# Patient Record
Sex: MALE | Employment: OTHER | URBAN - METROPOLITAN AREA
[De-identification: names, ages, dates, MRNs, and addresses within clinical notes are randomized per-mention and may not be internally consistent; named-entity substitution may affect disease eponyms.]

---

## 2021-11-26 ENCOUNTER — THERAPY VISIT (OUTPATIENT)
Dept: PHYSICAL THERAPY | Facility: CLINIC | Age: 66
End: 2021-11-26
Payer: COMMERCIAL

## 2021-11-26 DIAGNOSIS — M79.671 PAIN IN BOTH FEET: ICD-10-CM

## 2021-11-26 DIAGNOSIS — M79.672 PAIN IN BOTH FEET: ICD-10-CM

## 2021-11-26 PROCEDURE — 97161 PT EVAL LOW COMPLEX 20 MIN: CPT | Mod: GP

## 2021-11-26 PROCEDURE — 97110 THERAPEUTIC EXERCISES: CPT | Mod: GP

## 2021-11-26 NOTE — PROGRESS NOTES
Physical Therapy Initial Evaluation  Subjective:  The history is provided by the patient. No  was used.   Therapist Generated HPI Evaluation  Problem details: The patient reports he has had some numbness in the bottom of his feet for many years.  He had quadriceps tendon surgery many years ago, but now the the numbness in the bottom of the feet has become painful.  Hard surface is more painful. Worse at the day goes on. He reports he has a history of a problem in the right foot.  He recently saw Dr. Mireles who diagnosed him with plantar fascitis bilaterally..         Type of problem:  Bilateral ankles.    This is a chronic condition.  Condition occurred with:  Repetition/overuse.  Where condition occurred: for unknown reasons.  Patient reports pain:  Longitudinal arch.  Pain is described as aching, burning and sharp   Pain radiates to:  No radiation. Pain is worse in the P.M..  Since onset symptoms are gradually worsening.  Associated symptoms:  Edema, loss of motion/stiffness and numbness. Symptoms are exacerbated by walking, weight bearing and activity  and relieved by rest.      Restrictions due to condition include:  Working in normal job without restrictions.  Barriers include:  None as reported by patient.    Patient Health History  Noble Bustillos being seen for Plantar Fasciitis.     Problem began: 5/26/2021.   Problem occurred: Gradual   Pain is reported as 6/10 on pain scale.  General health as reported by patient is good.  Pertinent medical history includes: high blood pressure, overweight and osteoarthritis.   Red flags:  None as reported by patient.         Current medications:  Other.    Current occupation is Self Employed.   Primary job tasks include:  Computer work.                                    Objective:    Gait:  Antalgic on the right slightly  Gait Type:  Antalgic               Ankle/Foot Evaluation  ROM:  Arom ankle eval: Limited in DF.Prom wnl ankle: Limited in  DF.  AROM:    Dorsiflexion:  Left:   5  Right:   2            PROM:    Dorsiflexion:  Left:    8     Right:   4                 Strength is normal.  LIGAMENT TESTING: normal              SPECIAL TESTS: normal    PALPATION: Palpation of ankle: TTP along plantar fascia bilaterally.      MOBILITY TESTING: Mobility testing ankle: Hypomobility of the calcaneous, mid foot and forefoot bilaterally.                                                                General     ROS    Assessment/Plan:    Patient is a 66 year old male with both sides ankle complaints.    Patient has the following significant findings with corresponding treatment plan.                Diagnosis 1:  Bilateral Foot Pain  Pain -  hot/cold therapy, US, manual therapy and home program  Decreased ROM/flexibility - manual therapy, therapeutic exercise and home program  Decreased joint mobility - manual therapy, therapeutic exercise and home program    Therapy Evaluation Codes:   1) History comprised of:   Personal factors that impact the plan of care:      None.    Comorbidity factors that impact the plan of care are:      None.     Medications impacting care: None.  2) Examination of Body Systems comprised of:   Body structures and functions that impact the plan of care:      Ankle.   Activity limitations that impact the plan of care are:      Walking.  3) Clinical presentation characteristics are:   Stable/Uncomplicated.  4) Decision-Making    Low complexity using standardized patient assessment instrument and/or measureable assessment of functional outcome.  Cumulative Therapy Evaluation is: Low complexity.    Previous and current functional limitations:  (See Goal Flow Sheet for this information)    Short term and Long term goals: (See Goal Flow Sheet for this information)     Communication ability:  Patient appears to be able to clearly communicate and understand verbal and written communication and follow directions correctly.  Treatment Explanation  - The following has been discussed with the patient:   RX ordered/plan of care  Anticipated outcomes  Possible risks and side effects  This patient would benefit from PT intervention to resume normal activities.   Rehab potential is good.    Frequency:  One X week, once daily  Duration:  for 6 weeks  Discharge Plan:  Achieve all LTG.  Independent in home treatment program.  Reach maximal therapeutic benefit.    Please refer to the daily flowsheet for treatment today, total treatment time and time spent performing 1:1 timed codes.

## 2022-01-06 PROBLEM — M79.671 PAIN IN BOTH FEET: Status: RESOLVED | Noted: 2021-11-26 | Resolved: 2022-01-06

## 2022-01-06 PROBLEM — M79.672 PAIN IN BOTH FEET: Status: RESOLVED | Noted: 2021-11-26 | Resolved: 2022-01-06
